# Patient Record
Sex: MALE
[De-identification: names, ages, dates, MRNs, and addresses within clinical notes are randomized per-mention and may not be internally consistent; named-entity substitution may affect disease eponyms.]

---

## 2023-10-04 ENCOUNTER — TRANSCRIPTION ENCOUNTER (OUTPATIENT)
Age: 38
End: 2023-10-04

## 2023-10-04 RX ORDER — ACETAMINOPHEN 500 MG
1000 TABLET ORAL ONCE
Refills: 0 | Status: COMPLETED | OUTPATIENT
Start: 2023-10-05 | End: 2023-10-05

## 2023-10-04 RX ORDER — APREPITANT 80 MG/1
40 CAPSULE ORAL ONCE
Refills: 0 | Status: COMPLETED | OUTPATIENT
Start: 2023-10-05 | End: 2023-10-05

## 2023-10-04 NOTE — ASU PATIENT PROFILE, ADULT - REASON FOR ADMISSION, PROFILE
Sinus surgery CT GUIDED,SUBMUCOUS RESECTION OF NASAL SEPTUM,ENDOSCOPIC SINUS SURGERY,TURBINATE FRACTURE,TURBINATE COBLATION,BILATERAL TOTAL ETHMOIDECOMY,BILATERAL MAXILLARY ANTROSTOMY WITH TISSUE REMOVAL,BRAINLAB.

## 2023-10-04 NOTE — ASU PATIENT PROFILE, ADULT - NSICDXPASTSURGICALHX_GEN_ALL_CORE_FT
PAST SURGICAL HISTORY:  History of radiofrequency ablation (RFA) of nerve of lumbar spine     S/P LASIK surgery bilateral    S/P right knee surgery     S/P sinus surgery

## 2023-10-04 NOTE — ASU PATIENT PROFILE, ADULT - FALL HARM RISK - UNIVERSAL INTERVENTIONS
Bed in lowest position, wheels locked, appropriate side rails in place/Call bell, personal items and telephone in reach/Instruct patient to call for assistance before getting out of bed or chair/Non-slip footwear when patient is out of bed/[place] to call system/Physically safe environment - no spills, clutter or unnecessary equipment/Purposeful Proactive Rounding/Room/bathroom lighting operational, light cord in reach Bed in lowest position, wheels locked, appropriate side rails in place/Call bell, personal items and telephone in reach/Instruct patient to call for assistance before getting out of bed or chair/Non-slip footwear when patient is out of bed/[place] to call system/Physically safe environment - no spills, clutter or unnecessary equipment/Purposeful Proactive Rounding/Room/bathroom lighting operational, light cord in reach Bed in lowest position, wheels locked, appropriate side rails in place/Call bell, personal items and telephone in reach/Instruct patient to call for assistance before getting out of bed or chair/Non-slip footwear when patient is out of bed/[place] to call system/Physically safe environment - no spills, clutter or unnecessary equipment/Purposeful Proactive Rounding/Room/bathroom lighting operational, light cord in reach

## 2023-10-04 NOTE — PRE PROCEDURE NOTE - PRE PROCEDURE EVALUATION
PRE ADMISSION NOTE  Diagnosis:   Deviated nasal septum, chronic sinusitis, headaches, turbinate hypertrophy  Planned Surgical Procedures:   [facility]- CT guided FESS-turbinate reduction  Indications for surgery and specialty history     37 yo male complains of significant facial pain and nasal congestion over the last year and worst over the last 3 months.  he has had at least 6 sinus infections over the last year resistant to antibiotics, nasal steroid sprays, OTC antihistamines and oral steroids.  he has had some type of limited sinus surgery 2 years ago without improvement but incomplete.  he has tried and failed irrigations of the sinuses  PE:   Nose: severe deviated nasal septum blocked natural ostia of both max sinsues with inflammation in the sinuses.  scattered polypoid changes.  uncinates still intake blocking     Medical History:      Medications:     Allergies    No Known Allergies    Intolerances        Special Comments:    Post op first visit given date and time:     Patient called the evening before surgery:     Patient given all postop instructions and medications in advance of surgery:       Consent in chart      Discussed all risks, benefits, complications, problems, realistic expectations, chance of recurrence, possible need for further surgery, need for patient follow-up, postop course etc discussed and fully understood.        All questions answered   PRE ADMISSION NOTE  Diagnosis:   Deviated nasal septum, chronic sinusitis, headaches, turbinate hypertrophy  Planned Surgical Procedures:   [facility]- CT guided FESS-turbinate reduction  Indications for surgery and specialty history     39 yo male complains of significant facial pain and nasal congestion over the last year and worst over the last 3 months.  he has had at least 6 sinus infections over the last year resistant to antibiotics, nasal steroid sprays, OTC antihistamines and oral steroids.  he has had some type of limited sinus surgery 2 years ago without improvement but incomplete.  he has tried and failed irrigations of the sinuses  PE:   Nose: severe deviated nasal septum blocked natural ostia of both max sinsues with inflammation in the sinuses.  scattered polypoid changes.  uncinates still intake blocking     Medical History:      Medications:     Allergies    No Known Allergies    Intolerances        Special Comments:    Post op first visit given date and time:     Patient called the evening before surgery:     Patient given all postop instructions and medications in advance of surgery:       Consent in chart      Discussed all risks, benefits, complications, problems, realistic expectations, chance of recurrence, possible need for further surgery, need for patient follow-up, postop course etc discussed and fully understood.        All questions answered   PRE ADMISSION NOTE  Diagnosis:   Deviated nasal septum, chronic sinusitis, headaches, turbinate hypertrophy  Planned Surgical Procedures:   [facility]- CT guided FESS-turbinate reduction  Indications for surgery and specialty history     39 yo male complains of significant facial pain and nasal congestion over the last year and worst over the last 3 months.  he has had at least 6 sinus infections over the last year resistant to antibiotics, nasal steroid sprays, OTC antihistamines and oral steroids.  he has had some type of limited sinus surgery 2 years ago without improvement but incomplete.  he has tried and failed irrigations of the sinuses  PE:   Nose: severe deviated nasal septum blocked natural ostia of both max sinsues with inflammation in the sinuses.  scattered polypoid changes.  uncinates still intake blocking     Medical History:      Medications:     Allergies    No Known Allergies    Intolerances        Special Comments:    Post op first visit given date and time:     Patient called the evening before surgery:     Patient given all postop instructions and medications in advance of surgery:       Consent in chart      Discussed all risks, benefits, complications, problems, realistic expectations, chance of recurrence, possible need for further surgery, need for patient follow-up, postop course etc discussed and fully understood.        All questions answered   PRE ADMISSION NOTE  Diagnosis:   Deviated nasal septum, chronic sinusitis, headaches, turbinate hypertrophy  Planned Surgical Procedures:   [facility]- CT guided FESS-turbinate reduction  Indications for surgery and specialty history     39 yo male complains of significant facial pain and nasal congestion over the last year and worst over the last 3 months.  he has had at least 6 sinus infections over the last year resistant to antibiotics, nasal steroid sprays, OTC antihistamines and oral steroids.  he has had some type of limited sinus surgery 2 years ago without improvement but incomplete.  he has tried and failed irrigations of the sinuses  PE:   Nose: severe deviated nasal septum blocked natural ostia of both max sinsues with inflammation in the sinuses.  scattered polypoid changes.  uncinates still intake blocking     Medical History:  FESS by other surgeon  Medications:  Flonase     Allergies    No Known Allergies    Intolerances    Special Comments:    Post op first visit given date and time: Oct 6, 2023: 7: 30 am    Patient called the evening before surgery: yes    Patient given all postop instructions and medications in advance of surgery: yes and written on day of surgery      Consent in chart      Discussed all risks, benefits, complications, problems, realistic expectations, chance of recurrence, possible need for further surgery, need for patient follow-up, postop course etc discussed and fully understood.        All questions answered   PRE ADMISSION NOTE  Diagnosis:   Deviated nasal septum, chronic sinusitis, headaches, turbinate hypertrophy  Planned Surgical Procedures:   [facility]- CT guided FESS-turbinate reduction  Indications for surgery and specialty history     39 yo male complains of significant facial pain and nasal congestion over the last year and worst over the last 3 months.  he has had at least 6 sinus infections over the last year resistant to antibiotics, nasal steroid sprays, OTC antihistamines and oral steroids.  he has had some type of limited sinus surgery 2 years ago without improvement but incomplete.  he has tried and failed irrigations of the sinuses  PE:   Nose: severe deviated nasal septum blocked natural ostia of both max sinsues with inflammation in the sinuses.  scattered polypoid changes.  uncinates still intake blocking     Medical History:  FESS by other surgeon  Medications:  Flonase     Allergies    No Known Allergies    Intolerances    Special Comments:    Post op first visit given date and time: Oct 6, 2023: 7: 30 am    Patient called the evening before surgery: yes    Patient given all postop instructions and medications in advance of surgery: yes and written on day of surgery      Consent in chart      Discussed all risks, benefits, complications, problems, realistic expectations, chance of recurrence, possible need for further surgery, need for patient follow-up, postop course etc discussed and fully understood.        All questions answered   PRE ADMISSION NOTE  Diagnosis:   Deviated nasal septum, chronic sinusitis, headaches, turbinate hypertrophy  Planned Surgical Procedures:   [facility]- CT guided FESS-turbinate reduction  Indications for surgery and specialty history     37 yo male complains of significant facial pain and nasal congestion over the last year and worst over the last 3 months.  he has had at least 6 sinus infections over the last year resistant to antibiotics, nasal steroid sprays, OTC antihistamines and oral steroids.  he has had some type of limited sinus surgery 2 years ago without improvement but incomplete.  he has tried and failed irrigations of the sinuses  PE:   Nose: severe deviated nasal septum blocked natural ostia of both max sinsues with inflammation in the sinuses.  scattered polypoid changes.  uncinates still intake blocking     Medical History:  FESS by other surgeon  Medications:  Flonase     Allergies    No Known Allergies    Intolerances    Special Comments:    Post op first visit given date and time: Oct 6, 2023: 7: 30 am    Patient called the evening before surgery: yes    Patient given all postop instructions and medications in advance of surgery: yes and written on day of surgery      Consent in chart      Discussed all risks, benefits, complications, problems, realistic expectations, chance of recurrence, possible need for further surgery, need for patient follow-up, postop course etc discussed and fully understood.        All questions answered

## 2023-10-05 ENCOUNTER — TRANSCRIPTION ENCOUNTER (OUTPATIENT)
Age: 38
End: 2023-10-05

## 2023-10-05 ENCOUNTER — OUTPATIENT (OUTPATIENT)
Dept: OUTPATIENT SERVICES | Facility: HOSPITAL | Age: 38
LOS: 1 days | Discharge: HOME | End: 2023-10-05
Payer: COMMERCIAL

## 2023-10-05 VITALS
DIASTOLIC BLOOD PRESSURE: 76 MMHG | WEIGHT: 179.46 LBS | SYSTOLIC BLOOD PRESSURE: 125 MMHG | OXYGEN SATURATION: 100 % | RESPIRATION RATE: 16 BRPM | HEIGHT: 72 IN | TEMPERATURE: 97 F | HEART RATE: 56 BPM

## 2023-10-05 VITALS
RESPIRATION RATE: 16 BRPM | DIASTOLIC BLOOD PRESSURE: 75 MMHG | HEART RATE: 57 BPM | SYSTOLIC BLOOD PRESSURE: 137 MMHG | TEMPERATURE: 98 F | OXYGEN SATURATION: 100 %

## 2023-10-05 DIAGNOSIS — Z98.890 OTHER SPECIFIED POSTPROCEDURAL STATES: Chronic | ICD-10-CM

## 2023-10-05 LAB
GRAM STN FLD: SIGNIFICANT CHANGE UP
SPECIMEN SOURCE: SIGNIFICANT CHANGE UP

## 2023-10-05 PROCEDURE — 88311 DECALCIFY TISSUE: CPT | Mod: 26

## 2023-10-05 PROCEDURE — 88305 TISSUE EXAM BY PATHOLOGIST: CPT | Mod: 26

## 2023-10-05 PROCEDURE — 88300 SURGICAL PATH GROSS: CPT | Mod: 26,59

## 2023-10-05 RX ORDER — HYDROMORPHONE HYDROCHLORIDE 2 MG/ML
0.5 INJECTION INTRAMUSCULAR; INTRAVENOUS; SUBCUTANEOUS
Refills: 0 | Status: DISCONTINUED | OUTPATIENT
Start: 2023-10-05 | End: 2023-10-05

## 2023-10-05 RX ORDER — BENZOCAINE AND MENTHOL 5; 1 G/100ML; G/100ML
1 LIQUID ORAL ONCE
Refills: 0 | Status: COMPLETED | OUTPATIENT
Start: 2023-10-05 | End: 2023-10-05

## 2023-10-05 RX ORDER — LIDOCAINE 4 G/100G
10 CREAM TOPICAL ONCE
Refills: 0 | Status: COMPLETED | OUTPATIENT
Start: 2023-10-05 | End: 2023-10-05

## 2023-10-05 RX ORDER — SODIUM CHLORIDE 9 MG/ML
500 INJECTION, SOLUTION INTRAVENOUS
Refills: 0 | Status: DISCONTINUED | OUTPATIENT
Start: 2023-10-05 | End: 2023-10-05

## 2023-10-05 RX ORDER — OXYCODONE HYDROCHLORIDE 5 MG/1
5 TABLET ORAL ONCE
Refills: 0 | Status: DISCONTINUED | OUTPATIENT
Start: 2023-10-05 | End: 2023-10-05

## 2023-10-05 RX ORDER — FENTANYL CITRATE 50 UG/ML
50 INJECTION INTRAVENOUS
Refills: 0 | Status: DISCONTINUED | OUTPATIENT
Start: 2023-10-05 | End: 2023-10-05

## 2023-10-05 RX ORDER — RACEPINEPHRINE HCL 2.25 %
1 SOLUTION, NON-ORAL TOPICAL ONCE
Refills: 0 | Status: DISCONTINUED | OUTPATIENT
Start: 2023-10-05 | End: 2023-10-05

## 2023-10-05 RX ADMIN — Medication 1000 MILLIGRAM(S): at 06:00

## 2023-10-05 RX ADMIN — BENZOCAINE AND MENTHOL 1 LOZENGE: 5; 1 LIQUID ORAL at 10:24

## 2023-10-05 RX ADMIN — APREPITANT 40 MILLIGRAM(S): 80 CAPSULE ORAL at 06:00

## 2023-10-05 RX ADMIN — LIDOCAINE 10 MILLILITER(S): 4 CREAM TOPICAL at 10:49

## 2023-10-05 RX ADMIN — SODIUM CHLORIDE 150 MILLILITER(S): 9 INJECTION, SOLUTION INTRAVENOUS at 10:29

## 2023-10-05 NOTE — BRIEF OPERATIVE NOTE - NSICDXBRIEFPREOP_GEN_ALL_CORE_FT
PRE-OP DIAGNOSIS:  Nasal turbinate hypertrophy 05-Oct-2023 07:30:00  Wayne Love  Chronic headaches 05-Oct-2023 07:29:44  Wayne Love  Chronic sinusitis 05-Oct-2023 07:29:39  Wayne Love  Deviated nasal septum 05-Oct-2023 07:29:31  Wayne Love

## 2023-10-05 NOTE — BRIEF OPERATIVE NOTE - OPERATION/FINDINGS
1. severe deviated septum left more than right with scarring  2. scarr left septum to lateral wall  3. lateralized middle turb blockign ethmoids bilaterally  4. pus and polyp left ethmoid  5. blocked right ethmoids  6. blocked ostia bILATERAL max sinsues  7. completely retained uncinate  8.huge inferior turbs

## 2023-10-05 NOTE — BRIEF OPERATIVE NOTE - NSICDXBRIEFPOSTOP_GEN_ALL_CORE_FT
POST-OP DIAGNOSIS:  Deviated nasal septum 05-Oct-2023 07:30:25  Wayne Love  Chronic sinusitis 05-Oct-2023 07:30:16  Wayne Love  Chronic headaches 05-Oct-2023 07:30:12  Wayne Love  Nasal turbinate hypertrophy 05-Oct-2023 07:30:05  Wayne Love   POST-OP DIAGNOSIS:  Deviated nasal septum 05-Oct-2023 07:30:25  Wayne Love  Chronic sinusitis 05-Oct-2023 07:30:16  Wayne Love  Chronic headaches 05-Oct-2023 07:30:12  Wayne oLve  Nasal turbinate hypertrophy 05-Oct-2023 07:30:05  Wayne Love

## 2023-10-05 NOTE — ASU DISCHARGE PLAN (ADULT/PEDIATRIC) - NS MD DC FALL RISK RISK
For information on Fall & Injury Prevention, visit: [email or web address]-[email or web address]-mobility OR  [email or web address]://www.[facility]/news/fall-prevention-tips-to-avoid-injury OR  https://www.cdc.gov/steadi/patient.html For information on Fall & Injury Prevention, visit: [email or web address]://www.[email or web address]/news/fall-prevention-[email or web address]-maintains-health-[email or web address]-mobility OR  [email or web address]://www.[email or web address]/news/fall-prevention-tips-[email or web address]-injury OR  https://www.cdc.gov/steadi/patient.html For information on Fall & Injury Prevention, visit: [email or web address]://www.[facility]/news/fall-prevention-protects-and-maintains-[email or web address]-mobility OR  https://www.[facility]/news/fall-prevention-tips-to-avoid-injury OR  https://www.cdc.gov/steadi/patient.html

## 2023-10-05 NOTE — BRIEF OPERATIVE NOTE - NSICDXBRIEFPROCEDURE_GEN_ALL_CORE_FT
PROCEDURES:  SMR - Submucous resection 05-Oct-2023 07:30:34  Wayne Love  FESS, adult 05-Oct-2023 07:30:39  Wayne Love  CT guided localization 05-Oct-2023 07:30:45  Wayne Love  Surgical fracture of inferior nasal turbinate 05-Oct-2023 07:30:52  Wayne Love  Coblation of nasal turbinate 05-Oct-2023 07:31:05  Wayne Love

## 2023-10-05 NOTE — PRE-ANESTHESIA EVALUATION ADULT - NSANTHADDINFOFT_GEN_ALL_CORE
Pt accepts all anesthesia risks IBNLT: Dental, Pharyngeal, Laryngeal, Tracheal Trauma, Sore Throat, Hoarseness, Recurrent Laryngeal Nerve Dysfunction, Upper and Lower Extremity Paresthesias, Nausea and Vomiting, Potential exacerbation of asthma and CODY.

## 2023-10-05 NOTE — ASU DISCHARGE PLAN (ADULT/PEDIATRIC) - PROVIDER TOKENS
PROVIDER:[TOKEN:[[id number]],SCHEDULEDAPPT:[10/06/2023],SCHEDULEDAPPTTIME:[07:30 AM],ESTABLISHEDPATIENT:[T]]

## 2023-10-05 NOTE — ASU DISCHARGE PLAN (ADULT/PEDIATRIC) - CARE PROVIDER_API CALL
Wayne Love  Otolaryngology  [facility] [place] [address]  Phone: [phone]  Fax: [phone]  Established Patient  Scheduled Appointment: 10/06/2023 07:30 AM   Wayne Love  Otolaryngology  [facility] [place] [address]  Phone: [phone]  Fax: [phone]  Established Patient  Scheduled Appointment: 10/06/2023 07:30 AM   Wayne Love  Otolaryngology  [facility] [place] [address]  Phone: [phone]  Fax: [phone]  Established Patient  Scheduled Appointment: 10/06/2023 07:30 AM

## 2023-10-07 LAB
CULTURE RESULTS: SIGNIFICANT CHANGE UP
SPECIMEN SOURCE: SIGNIFICANT CHANGE UP

## 2023-10-16 LAB
SURGICAL PATHOLOGY STUDY: SIGNIFICANT CHANGE UP

## 2023-12-29 RX ORDER — CLONAZEPAM 1 MG
1 TABLET ORAL
Refills: 0 | DISCHARGE
